# Patient Record
(demographics unavailable — no encounter records)

---

## 2025-03-12 NOTE — PHYSICAL EXAM
[Alert] : alert [Normal Voice/Communication] : normal voice/communication [Healthy Appearing] : healthy appearing [No Acute Distress] : no acute distress [Sclera] : the sclera and conjunctiva were normal [Hearing Threshold Finger Rub Not [place]] : hearing was normal [Normal Appearance] : the appearance of the neck was normal [No Respiratory Distress] : no respiratory distress [Abdomen Tenderness] : non-tender [No Masses] : no abdominal mass palpated [Abdomen Soft] : soft [Abnormal Walk] : normal gait [Oriented To Time, Place, And Person] : oriented to person, place, and time

## 2025-03-12 NOTE — HISTORY OF PRESENT ILLNESS
[FreeTextEntry1] : 1/24/25 w/Dr. Stubbs: -polyp (8-10 mm) in appendicial orifice (bx-hyperplastic polyp) -normal mucosa in ascending colon -internal hemorrhoids -colon otherwise unremarkable

## 2025-03-12 NOTE — ASSESSMENT
[FreeTextEntry1] : 46 y/o with PMH HLD, hypothyroidism presents for Colonoscopy w/EMR consult. Had recent colonoscopy 1/24/25 with Dr. Stubbs with finding of 8-10 mm polyp in appendiceal orifice (bx hyperplastic polyp) that was unable to be removed.  Referred by: Dr. Stubbs  #Hyperplastic Polyp in appendiceal orifice -Schedule patient for colonoscopy at [facility] with Golytely prep. Discussed R/A/I/B with patient. Education provided on preparation instructions and the need for an escort. -Informed pt to d/c Zepound for 1 week prior to procedure if he starts taking prior to procedure: was recommended by PCP, pt has not started taking yet -CBC/BMP ordered and will be reviewed prior to procedure  F/u post-procedure  ISerena NP, am scribing for and in the presence of Dr. Clifton Palomo the following sections: history of present illness, past medical/family/social history; review of systems; vital signs; physical exam; disposition.  IClifton MD, personally performed the services described in the documentation, reviewed the documentation recorded by the scribe in my presence and it accurately and completely records my words and actions.